# Patient Record
Sex: MALE | Race: WHITE | NOT HISPANIC OR LATINO | Employment: UNEMPLOYED | URBAN - METROPOLITAN AREA
[De-identification: names, ages, dates, MRNs, and addresses within clinical notes are randomized per-mention and may not be internally consistent; named-entity substitution may affect disease eponyms.]

---

## 2017-10-26 ENCOUNTER — HOSPITAL ENCOUNTER (EMERGENCY)
Facility: HOSPITAL | Age: 62
Discharge: HOME | End: 2017-10-26
Attending: EMERGENCY MEDICINE
Payer: COMMERCIAL

## 2017-10-26 VITALS
WEIGHT: 250 LBS | BODY MASS INDEX: 32.08 KG/M2 | HEART RATE: 87 BPM | OXYGEN SATURATION: 98 % | TEMPERATURE: 97 F | SYSTOLIC BLOOD PRESSURE: 157 MMHG | HEIGHT: 74 IN | DIASTOLIC BLOOD PRESSURE: 95 MMHG | RESPIRATION RATE: 16 BRPM

## 2017-10-26 DIAGNOSIS — M65.4 DE QUERVAIN'S TENOSYNOVITIS, RIGHT: Primary | ICD-10-CM

## 2017-10-26 PROCEDURE — 99283 EMERGENCY DEPT VISIT LOW MDM: CPT

## 2017-10-26 RX ORDER — NAPROXEN 500 MG/1
500 TABLET ORAL 2 TIMES DAILY WITH MEALS
Qty: 10 TABLET | Refills: 0 | Status: SHIPPED | OUTPATIENT
Start: 2017-10-26 | End: 2017-10-31

## 2017-10-26 NOTE — ED PROVIDER NOTES
"Encounter Date: 10/26/2017       History     Chief Complaint   Patient presents with    Hand Pain     no injury     Patient is a 61 year old male who presents with right thumb pain for about three days. He denied PMH. 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 He states the pain is isolated to the thumb and to the radial wrist. He states he saw online a test, which is the Finkelstein test, that reproduced his pain so he bought a thumb spica splint which he has been using. He states he took the splint off last night and felt like he extended his hand and is concerned he "broke my tendon". He denied decreased strength, erythema, warmth or specific injury. He denied numbness/tingling.       The history is provided by the patient.     Review of patient's allergies indicates:  No Known Allergies  No past medical history on file.  No past surgical history on file.  History reviewed. No pertinent family history.  Social History   Substance Use Topics    Smoking status: Not on file    Smokeless tobacco: Not on file    Alcohol use Not on file     Review of Systems   Constitutional: Negative for activity change, appetite change, chills and fever.   HENT: Negative for congestion, rhinorrhea and sore throat.    Respiratory: Negative for cough, chest tightness and shortness of breath.    Cardiovascular: Negative for chest pain.   Gastrointestinal: Negative for abdominal pain, diarrhea, nausea and vomiting.   Genitourinary: Negative for dysuria and frequency.   Musculoskeletal: Negative for back pain, neck pain and neck stiffness.        + right thumb pain   Skin: Negative for rash.   Neurological: Negative for dizziness, syncope, numbness and headaches.       Physical Exam     Initial Vitals [10/26/17 0908]   BP Pulse Resp Temp SpO2   (!) 157/95 87 16 97.4 °F (36.3 °C) 98 %      MAP       115.67         Physical Exam    Constitutional: " Vital signs are normal. He appears well-developed and well-nourished. He is cooperative.  Non-toxic appearance. He does not have a sickly appearance.   HENT:   Head: Normocephalic and atraumatic.   Right Ear: External ear normal.   Left Ear: External ear normal.   Nose: Nose normal.   Mouth/Throat: Oropharynx is clear and moist.   Eyes: Conjunctivae and lids are normal. Pupils are equal, round, and reactive to light.   Neck: Normal range of motion and full passive range of motion without pain. Neck supple.   Cardiovascular: Normal rate and regular rhythm.   Pulmonary/Chest: Breath sounds normal. No respiratory distress. He has no wheezes.   Abdominal: Soft. Normal appearance. There is no tenderness. There is no rigidity, no rebound and no guarding.   Musculoskeletal:        Right hand: He exhibits tenderness. He exhibits normal range of motion, no bony tenderness, no deformity and no swelling. Normal sensation noted. Normal strength noted.   Tenderness to the radial wrist and with movement of the thumb. He has a positive Finkelstein test. He has no swelling, erythema or warmth. He has full ROM. He has normal strength. Negative phalen's sign.    Neurological: He is alert and oriented to person, place, and time.   Skin: Skin is warm, dry and intact. No rash noted.         ED Course   Procedures  Labs Reviewed - No data to display          Medical Decision Making:   History:   Old Medical Records: I decided to obtain old medical records.       APC / Resident Notes:   This is an emergent evaluation of a 61-year-old male who presents with right hand pain.  He has tenderness over the radial aspect of the right wrist and thumb.  He has a positive Finkelstein test.  He has no swelling, erythema or warmth.  I doubt acute, infected tendosynovitis.  He denied injury.  My suspicion for fracture is low.  He has good strength.  No sign of tendon rupture.  He is neurovascularly intact.  He denied any bodily thumb spica splint  which I've instructed him to continue to wear.  Will add NSAIDs and close follow-up with orthopedics. Discussed results with patient. Return precautions given. Patient is to follow up with their primary care provider. Case was discussed with Dr. Mahmood who is in agreement with the plan of care. All questions answered.            Attending Attestation:     Physician Attestation Statement for NP/PA:   I discussed this assessment and plan of this patient with the NP/PA, but I did not personally examine the patient. The face to face encounter was performed by the NP/PA.                  ED Course      Clinical Impression:   The encounter diagnosis was De Quervain's tenosynovitis, right.                           LEANNE DumontC  10/26/17 6546       Gio Mahmood MD  10/27/17 5369

## 2017-10-26 NOTE — DISCHARGE INSTRUCTIONS
See handout for ways to help with the pain.  Take medication as prescribed.  See orthopedics if symptoms don't improve.  REST the hand.  For worsening symptoms, chest pain, shortness of breath, increased abdominal pain, high grade fever, stroke or stroke like symptoms, immediately go to the nearest Emergency Room or call 911 as soon as possible.

## 2024-06-13 DIAGNOSIS — R06.83 SNORING: ICD-10-CM

## 2024-06-13 DIAGNOSIS — G47.33 OBSTRUCTIVE SLEEP APNEA: Primary | ICD-10-CM

## 2024-06-13 DIAGNOSIS — R53.83 FATIGUE: ICD-10-CM

## 2024-06-13 DIAGNOSIS — G47.19 EXCESSIVE DAYTIME SLEEPINESS: ICD-10-CM

## 2024-06-24 ENCOUNTER — PROCEDURE VISIT (OUTPATIENT)
Dept: SLEEP MEDICINE | Facility: HOSPITAL | Age: 69
End: 2024-06-24
Attending: INTERNAL MEDICINE
Payer: MEDICARE

## 2024-06-24 DIAGNOSIS — R53.83 FATIGUE: ICD-10-CM

## 2024-06-24 DIAGNOSIS — G47.33 OBSTRUCTIVE SLEEP APNEA: ICD-10-CM

## 2024-06-24 DIAGNOSIS — G47.19 EXCESSIVE DAYTIME SLEEPINESS: ICD-10-CM

## 2024-06-24 DIAGNOSIS — R06.83 SNORING: ICD-10-CM

## 2024-06-24 PROCEDURE — 95806 SLEEP STUDY UNATT&RESP EFFT: CPT

## 2024-07-11 DIAGNOSIS — R06.83 SNORING: ICD-10-CM

## 2024-07-11 DIAGNOSIS — G47.19 EXCESSIVE DAYTIME SLEEPINESS: ICD-10-CM

## 2024-07-11 DIAGNOSIS — R53.83 FATIGUE: ICD-10-CM

## 2024-07-11 DIAGNOSIS — G47.33 OBSTRUCTIVE SLEEP APNEA: Primary | ICD-10-CM

## 2024-08-13 ENCOUNTER — PROCEDURE VISIT (OUTPATIENT)
Dept: SLEEP MEDICINE | Facility: HOSPITAL | Age: 69
End: 2024-08-13
Attending: INTERNAL MEDICINE
Payer: MEDICARE

## 2024-08-13 DIAGNOSIS — G47.19 EXCESSIVE DAYTIME SLEEPINESS: ICD-10-CM

## 2024-08-13 DIAGNOSIS — R06.83 SNORING: ICD-10-CM

## 2024-08-13 DIAGNOSIS — G47.33 OBSTRUCTIVE SLEEP APNEA: ICD-10-CM

## 2024-08-13 DIAGNOSIS — R53.83 FATIGUE: ICD-10-CM

## 2024-08-13 PROCEDURE — 95811 POLYSOM 6/>YRS CPAP 4/> PARM: CPT

## 2024-11-07 ENCOUNTER — TELEPHONE (OUTPATIENT)
Dept: PULMONOLOGY | Facility: CLINIC | Age: 69
End: 2024-11-07

## 2024-11-07 NOTE — TELEPHONE ENCOUNTER
----- Message from Meagan sent at 11/7/2024  1:28 PM CST -----  Contact: [phone]  Patient needs first available appointment due to sleep apnea. Pt stated that his previous provider is retiring and patient just started a sleep apnea machine and would need a follow up visit in Feb 2025. Please call patient at [phone]. Thanks KB

## 2025-02-06 ENCOUNTER — OFFICE VISIT (OUTPATIENT)
Dept: PULMONOLOGY | Facility: CLINIC | Age: 70
End: 2025-02-06
Payer: MEDICARE

## 2025-02-06 VITALS
BODY MASS INDEX: 36.4 KG/M2 | HEART RATE: 71 BPM | OXYGEN SATURATION: 98 % | DIASTOLIC BLOOD PRESSURE: 81 MMHG | HEIGHT: 74 IN | SYSTOLIC BLOOD PRESSURE: 138 MMHG | WEIGHT: 283.63 LBS

## 2025-02-06 DIAGNOSIS — Z77.090 PERSONAL HISTORY OF CONTACT WITH AND (SUSPECTED) EXPOSURE TO ASBESTOS: ICD-10-CM

## 2025-02-06 DIAGNOSIS — Z87.891 PERSONAL HISTORY OF NICOTINE DEPENDENCE: ICD-10-CM

## 2025-02-06 DIAGNOSIS — L40.0 PLAQUE PSORIASIS: ICD-10-CM

## 2025-02-06 DIAGNOSIS — Z87.09 HISTORY OF PNEUMOTHORAX: ICD-10-CM

## 2025-02-06 DIAGNOSIS — G47.33 OSA (OBSTRUCTIVE SLEEP APNEA): Primary | ICD-10-CM

## 2025-02-06 PROCEDURE — 3075F SYST BP GE 130 - 139MM HG: CPT | Mod: CPTII,S$GLB,, | Performed by: NURSE PRACTITIONER

## 2025-02-06 PROCEDURE — 3288F FALL RISK ASSESSMENT DOCD: CPT | Mod: CPTII,S$GLB,, | Performed by: NURSE PRACTITIONER

## 2025-02-06 PROCEDURE — 3008F BODY MASS INDEX DOCD: CPT | Mod: CPTII,S$GLB,, | Performed by: NURSE PRACTITIONER

## 2025-02-06 PROCEDURE — 99999 PR PBB SHADOW E&M-EST. PATIENT-LVL III: CPT | Mod: PBBFAC,,, | Performed by: NURSE PRACTITIONER

## 2025-02-06 PROCEDURE — 3079F DIAST BP 80-89 MM HG: CPT | Mod: CPTII,S$GLB,, | Performed by: NURSE PRACTITIONER

## 2025-02-06 PROCEDURE — 99214 OFFICE O/P EST MOD 30 MIN: CPT | Mod: S$GLB,,, | Performed by: NURSE PRACTITIONER

## 2025-02-06 PROCEDURE — 1101F PT FALLS ASSESS-DOCD LE1/YR: CPT | Mod: CPTII,S$GLB,, | Performed by: NURSE PRACTITIONER

## 2025-02-06 PROCEDURE — 1159F MED LIST DOCD IN RCRD: CPT | Mod: CPTII,S$GLB,, | Performed by: NURSE PRACTITIONER

## 2025-02-06 RX ORDER — CETIRIZINE HYDROCHLORIDE 10 MG/1
10 TABLET ORAL
COMMUNITY
Start: 2024-02-14 | End: 2025-02-13

## 2025-02-06 RX ORDER — AZELASTINE 1 MG/ML
2 SPRAY, METERED NASAL
COMMUNITY
Start: 2024-08-13 | End: 2025-08-13

## 2025-02-06 RX ORDER — ALBUTEROL SULFATE 90 UG/1
2 INHALANT RESPIRATORY (INHALATION) EVERY 6 HOURS PRN
COMMUNITY
Start: 2024-02-14 | End: 2025-02-13

## 2025-02-06 RX ORDER — DORZOLAMIDE HYDROCHLORIDE AND TIMOLOL MALEATE 20; 5 MG/ML; MG/ML
1 SOLUTION/ DROPS OPHTHALMIC 2 TIMES DAILY
COMMUNITY
Start: 2024-12-12

## 2025-02-06 RX ORDER — IPRATROPIUM BROMIDE 21 UG/1
2 SPRAY, METERED NASAL
COMMUNITY
Start: 2024-08-13 | End: 2025-08-13

## 2025-02-06 RX ORDER — PANTOPRAZOLE SODIUM 40 MG/1
40 TABLET, DELAYED RELEASE ORAL
COMMUNITY
Start: 2024-08-13 | End: 2025-08-13

## 2025-02-06 RX ORDER — SILDENAFIL 100 MG/1
100 TABLET, FILM COATED ORAL
COMMUNITY

## 2025-02-06 RX ORDER — MOMETASONE FUROATE MONOHYDRATE 50 UG/1
1 SPRAY, METERED NASAL 2 TIMES DAILY
COMMUNITY

## 2025-02-06 RX ORDER — LATANOPROST 50 UG/ML
1 SOLUTION/ DROPS OPHTHALMIC NIGHTLY
COMMUNITY
Start: 2025-01-13

## 2025-02-06 RX ORDER — MONTELUKAST SODIUM 10 MG/1
10 TABLET ORAL
COMMUNITY
Start: 2024-02-14 | End: 2025-02-13

## 2025-02-06 NOTE — PROGRESS NOTES
2/6/2025    Mejia Ruff  New Patient Consult    Chief Complaint   Patient presents with    Sleep Apnea       HPI:  02/06/2025: Hx: SHAI, Asbestosis Exposure, Pneumothorax  In office today alone.  Denies current use of inhaler therapy, supplemental oxygen. Denies personal history of cancer, PE, current anticoagulation use.  Former patient of Dr. Robbins for management of SHAI.   Patient underwent home sleep study in JUNE 2024 revealing RDI 17.4 - then underwent Titration study in AUG revealing the need for CPAP therapy. Using CPAP nightly with no great issue reported - states he does not notice great benefit with CPAP use - still suffering with insomnia - states he wakes up every two hours and eats throughout the night.   CPAP DME is [facility]. Using a full face mask currently.  Does endorse history of spontaneous pneumothorax x 2 approx 15 years ago - thinks may be secondary to horse back riding at that time - for the 2nd Pneumo underwent TALC placement.   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  States he developed cough that was severe in nature with productive mucus.  Has trialed several different inhalers with no reported benefit.  At that time was being followed by pulmonology out of [facility].  States cough and mucus has essentially resolved since onset.  Denies current use of inhaler therapy.  Around that time also underwent evaluation per allergist in which he was told there was an allergy to a specific grass.  Denies significant shortness of breath, wheezing, chest tightness, cough, mucus production.  Does endorse SOB with exertion such as walking outside 100 yards, working in the yard - typically improves with rest.         Social Hx: Lives with wife - no animals in the home. 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 [employer]. Positive Asbestosis exposure, Smoking Hx: Former smoker, quit 15 years ago -  started at 33 YO - typical use 1 ppd.   Family Hx: No Lung Cancer, Brother with COPD, No Asthma  Medical Hx: No previous pneumonia ; No previous shoulder/chest surgery        The Chief Complaint is: New to me      PFSH:  Past Medical History:   Diagnosis Date    Plaque psoriasis     Pneumothorax, unspecified          Past Surgical History:   Procedure Laterality Date    PLEURODESIS USING TALC       Social History     Tobacco Use    Smoking status: Former     Types: Cigarettes    Smokeless tobacco: Never     No family history on file.  Review of patient's allergies indicates:  No Known Allergies      I have reviewed past medical, family, and social history.     Performance Status:The patient's activity level is functions out of house.        Review of Systems   Constitutional:  Positive for fatigue. Negative for activity change, appetite change, chills, diaphoresis, fever and unexpected weight change.   HENT:  Negative for congestion, postnasal drip, rhinorrhea, sinus pressure, sinus pain, sore throat and trouble swallowing.    Eyes:  Negative for photophobia and visual disturbance.   Respiratory:  Negative for cough, choking, chest tightness, shortness of breath and wheezing.    Cardiovascular:  Negative for chest pain, palpitations and leg swelling.   Gastrointestinal:  Negative for abdominal distention, abdominal pain, blood in stool, constipation, diarrhea, nausea and vomiting.   Genitourinary:  Negative for difficulty urinating, dysuria, flank pain and hematuria.   Musculoskeletal:  Positive for neck pain. Negative for back pain, gait problem and joint swelling.   Skin:  Negative for rash and wound.   Allergic/Immunologic: Positive for environmental allergies and food allergies.        Lactose intolerance      Neurological:  Negative for dizziness, seizures, syncope, weakness, light-headedness, numbness and headaches.   Hematological:  Does not bruise/bleed easily.   Psychiatric/Behavioral:  Positive for sleep  "disturbance. Negative for confusion. The patient is not nervous/anxious.          Exam:Comprehensive exam done. /81 (BP Location: Right arm, Patient Position: Sitting)   Pulse 71   Ht 6' 2" (1.88 m)   Wt 128.7 kg (283 lb 10 oz)   SpO2 98% Comment: on room air at rest  BMI 36.41 kg/m²   Exam included Vitals as listed  Constitutional: He is oriented to person, place, and time. He appears well-developed. No distress.   Nose: Nose normal.   Mouth/Throat: Uvula is midline, oropharynx is clear and moist and mucous membranes are normal. No dental caries. No oropharyngeal exudate, posterior oropharyngeal edema, posterior oropharyngeal erythema or tonsillar abscesses.    Eyes: Pupils are equal, round, and reactive to light.   Neck: No JVD present. No thyromegaly present.   Cardiovascular: Normal rate, regular rhythm and normal heart sounds. Exam reveals no gallop and no friction rub.   No murmur heard.  Pulmonary/Chest: Effort normal and breath sounds normal. No accessory muscle usage or stridor. No apnea and no tachypnea. No respiratory distress, decreased breath sounds, wheezes, rhonchi, rales, or tenderness.   Abdominal: Soft. He exhibits no mass. There is no tenderness. No hepatosplenomegaly, hernias and normoactive bowel sounds  Musculoskeletal: Normal range of motion. exhibits no edema.   Neurological:  alert and oriented to person, place, and time. not disoriented.   Skin: Skin is warm and dry. Capillary refill takes less 2 sec. No cyanosis or erythema. No pallor. Nails show no clubbing.   Psychiatric: normal mood and affect. behavior is normal. Judgment and thought content normal.       Radiographs (ct chest and cxr) reviewed: was done by direct view   Patient imaging studies were reviewed and interpreted independently. My personal interpretation of most recent images include:    CT Abdomen Pelvis W Wo Contrast 7/15/2015 - RLL infiltrate vs atelectasis? Minimal pleural thickening RLL     Labs Patient's " labs were reviewed including CBC and CMP under Care Everywhere       PFT will be done and results to be reviewed  Pulmonary Functions Testing Results:        Plan:  Clinical impression is resonably certain and repeated evaluation prn +/- follow up will be needed as below.    Mejia was seen today for sleep apnea.    Diagnoses and all orders for this visit:    SHAI (obstructive sleep apnea)  -     CPAP/BIPAP SUPPLIES    History of pneumothorax  -     CT Chest Without Contrast; Future    Personal history of nicotine dependence    Personal history of contact with and (suspected) exposure to asbestos    Plaque psoriasis        Follow up in about 1 year (around 2/6/2026), or if symptoms worsen or fail to improve.    Discussed with patient above for education the following:      Patient Instructions   Waiting on your CPAP compliance report from [facility].    Continue CPAP nightly in the meantime.    I sent supply order to [facility] - this should be good for one year.    CT ABD from 2015 shows pleural thickening to right lower lobe - this is likely consistent with prior TALC powder placement for Pneumo however should be further investigated pre-cautiously.    CT chest without contrast ordered now.    Will request medical records from Dr. Maddy VILLAFUERTE in [place] and PFT from Dr. Robbins's office.    Continue current prescription medication regiment. Keep follow up appointment as scheduled. Please call the office if you have any questions or concerns.         Addend - CPAP compliance report reviewed from dates 11/09/2024 - 02/06/2025  Usage > = 4 hours 79%  APAP 7-20  AHI 2.0  95th percentile pressure 9.4

## 2025-02-06 NOTE — PATIENT INSTRUCTIONS
Waiting on your CPAP compliance report from [facility].    Continue CPAP nightly in the meantime.    I sent supply order to [facility] - this should be good for one year.    CT ABD from 2015 shows pleural thickening to right lower lobe - this is likely consistent with prior TALC powder placement for Pneumo however should be further investigated pre-cautiously.    CT chest without contrast ordered now.    Will request medical records from Dr. Maddy VILLAFUERTE in [place] and PFT from Dr. Robbins's office.    Continue current prescription medication regiment. Keep follow up appointment as scheduled. Please call the office if you have any questions or concerns.

## 2025-02-11 ENCOUNTER — TELEPHONE (OUTPATIENT)
Dept: PULMONOLOGY | Facility: CLINIC | Age: 70
End: 2025-02-11
Payer: MEDICARE

## 2025-02-11 NOTE — TELEPHONE ENCOUNTER
FAXED OVER MEDICAL RECORD RELEASE TO DR BHATIA'S OFFICE    FAX - [phone]  PHONE - [phone]

## 2025-04-14 ENCOUNTER — HOSPITAL ENCOUNTER (OUTPATIENT)
Dept: RADIOLOGY | Facility: HOSPITAL | Age: 70
Discharge: HOME | End: 2025-04-14
Attending: NURSE PRACTITIONER
Payer: MEDICARE

## 2025-04-14 DIAGNOSIS — Z87.09 HISTORY OF PNEUMOTHORAX: ICD-10-CM

## 2025-04-14 PROCEDURE — 71250 CT THORAX DX C-: CPT | Mod: TC

## 2025-04-21 ENCOUNTER — RESULTS FOLLOW-UP (OUTPATIENT)
Dept: PULMONOLOGY | Facility: CLINIC | Age: 70
End: 2025-04-21

## 2025-04-21 DIAGNOSIS — J18.9 PNEUMONIA OF LEFT LOWER LOBE DUE TO INFECTIOUS ORGANISM: Primary | ICD-10-CM

## 2025-04-21 RX ORDER — LEVOFLOXACIN 500 MG/1
500 TABLET, FILM COATED ORAL DAILY
Qty: 7 TABLET | Refills: 0 | Status: SHIPPED | OUTPATIENT
Start: 2025-04-21

## 2025-05-01 ENCOUNTER — TELEPHONE (OUTPATIENT)
Dept: PULMONOLOGY | Facility: CLINIC | Age: 70
End: 2025-05-01
Payer: MEDICARE

## 2025-05-01 NOTE — TELEPHONE ENCOUNTER
Pt stopped by clinic to  Sputum cups, and have XR scheduled. Appt scheduled 05/19/25 @ 0830, pt v/u.

## 2025-05-19 ENCOUNTER — HOSPITAL ENCOUNTER (OUTPATIENT)
Dept: RADIOLOGY | Facility: HOSPITAL | Age: 70
Discharge: HOME | End: 2025-05-19
Attending: NURSE PRACTITIONER
Payer: MEDICARE

## 2025-05-19 DIAGNOSIS — J18.9 PNEUMONIA OF LEFT LOWER LOBE DUE TO INFECTIOUS ORGANISM: ICD-10-CM

## 2025-05-19 PROCEDURE — 71046 X-RAY EXAM CHEST 2 VIEWS: CPT | Mod: 26,,, | Performed by: RADIOLOGY

## 2025-05-19 PROCEDURE — 71046 X-RAY EXAM CHEST 2 VIEWS: CPT | Mod: TC

## 2025-05-22 ENCOUNTER — RESULTS FOLLOW-UP (OUTPATIENT)
Dept: PULMONOLOGY | Facility: CLINIC | Age: 70
End: 2025-05-22

## 2025-05-26 DIAGNOSIS — J18.9 PNEUMONIA OF LEFT LOWER LOBE DUE TO INFECTIOUS ORGANISM: Primary | ICD-10-CM

## 2025-05-26 RX ORDER — LEVOFLOXACIN 500 MG/1
500 TABLET, FILM COATED ORAL DAILY
Qty: 14 TABLET | Refills: 0 | Status: SHIPPED | OUTPATIENT
Start: 2025-05-26 | End: 2025-06-09

## 2025-06-30 ENCOUNTER — LAB VISIT (OUTPATIENT)
Dept: LAB | Facility: HOSPITAL | Age: 70
End: 2025-06-30
Attending: NURSE PRACTITIONER
Payer: MEDICARE

## 2025-06-30 ENCOUNTER — TELEPHONE (OUTPATIENT)
Dept: PULMONOLOGY | Facility: CLINIC | Age: 70
End: 2025-06-30
Payer: MEDICARE

## 2025-06-30 DIAGNOSIS — J18.9 PNEUMONIA OF LEFT LOWER LOBE DUE TO INFECTIOUS ORGANISM: ICD-10-CM

## 2025-06-30 LAB
BACTERIA SPEC CULT: NORMAL
GRAM STAIN (RESPIRATORY) (SMH): NORMAL

## 2025-06-30 PROCEDURE — 87070 CULTURE OTHR SPECIMN AEROBIC: CPT

## 2025-06-30 NOTE — TELEPHONE ENCOUNTER
----- Message from Rita Carreon NP sent at 6/30/2025  2:11 PM CDT -----  Please let patient know a recollect is needed.  ----- Message -----  From: Lab, Background User  Sent: 6/30/2025   2:09 PM CDT  To: Rita Carreon NP

## 2025-07-07 ENCOUNTER — TELEPHONE (OUTPATIENT)
Dept: PULMONOLOGY | Facility: CLINIC | Age: 70
End: 2025-07-07
Payer: MEDICARE

## 2025-07-07 DIAGNOSIS — J18.9 PNEUMONIA OF LEFT LOWER LOBE DUE TO INFECTIOUS ORGANISM: Primary | ICD-10-CM

## 2025-07-07 NOTE — TELEPHONE ENCOUNTER
Spoke to patient.  Assisted him in scheduling his appts for his imaging orders.  Chest xray scheduled tomorrow.  He stated he's been coughing again, sometimes his sputum is clear and sometimes it is yellow.   I did ask him if he submitted sputum culture he said not as of yet.  I stressed for him to do that when he can.  Pt v/u.

## 2025-07-07 NOTE — TELEPHONE ENCOUNTER
Copied from CRM #[id number]. Topic: Appointments - Amb Referral  >> Jul 7, 2025  1:56 PM Lizette wrote:  Type:  Needs Medical Advice    Who Called: pt  Would the patient rather a call back or a response via [facility]? Call back   Best Call Back Number: [phone]   Additional Information: pt says doctor wanted a f/u chest xray, no orders in system and pt need to be scheduled

## 2025-07-08 ENCOUNTER — HOSPITAL ENCOUNTER (OUTPATIENT)
Dept: RADIOLOGY | Facility: HOSPITAL | Age: 70
Discharge: HOME | End: 2025-07-08
Attending: NURSE PRACTITIONER
Payer: MEDICARE

## 2025-07-08 DIAGNOSIS — J18.9 PNEUMONIA OF LEFT LOWER LOBE DUE TO INFECTIOUS ORGANISM: ICD-10-CM

## 2025-07-08 PROCEDURE — 71046 X-RAY EXAM CHEST 2 VIEWS: CPT | Mod: TC

## 2025-07-08 PROCEDURE — 71046 X-RAY EXAM CHEST 2 VIEWS: CPT | Mod: 26,,, | Performed by: RADIOLOGY

## 2025-07-16 ENCOUNTER — LAB VISIT (OUTPATIENT)
Dept: LAB | Facility: HOSPITAL | Age: 70
End: 2025-07-16
Attending: NURSE PRACTITIONER
Payer: MEDICARE

## 2025-07-16 DIAGNOSIS — J18.9 PNEUMONIA OF LEFT LOWER LOBE DUE TO INFECTIOUS ORGANISM: ICD-10-CM

## 2025-07-16 PROCEDURE — 87070 CULTURE OTHR SPECIMN AEROBIC: CPT

## 2025-07-18 LAB
BACTERIA SPEC CULT: NORMAL
GRAM STAIN (RESPIRATORY) (SMH): NORMAL

## 2025-07-23 ENCOUNTER — TELEPHONE (OUTPATIENT)
Dept: PULMONOLOGY | Facility: CLINIC | Age: 70
End: 2025-07-23
Payer: MEDICARE

## 2025-07-23 NOTE — TELEPHONE ENCOUNTER
Copied from CRM #[id number]. Topic: General Inquiry - Test Results  >> Jul 23, 2025  9:38 AM Med Assistant Emiliano wrote:  Type:  Test Results    Who Called: patient  Name of Test (Lab/Mammo/Etc): spudem  Ordering Provider: Rita Carreon  Where the test was performed: [facility]  Would the patient rather a call back or a response via [facility]? call  Best Call Back [facility]  [phone]  Additional Information:  Please call patient to advise.  Thanks!

## 2025-07-23 NOTE — TELEPHONE ENCOUNTER
Pt was informed of sputum and cxr results  pt stated he still has the cough off/on but his concern is the throat irritation and wanted to make sure that the tests were negative  pt thinks the irritation is due to him sleeping with his mouth open because he has been tested for allergies  also confirmed date/time of CT  pt v/u

## 2025-07-30 ENCOUNTER — TELEPHONE (OUTPATIENT)
Dept: PULMONOLOGY | Facility: CLINIC | Age: 70
End: 2025-07-30
Payer: MEDICARE

## 2025-07-30 NOTE — TELEPHONE ENCOUNTER
Copied from CRM #[id number]. Topic: Appointments - Appointment Scheduling  >> Jul 30, 2025 12:46 PM Deven wrote:  .Type:  Sooner Apoointment Request    Caller is requesting a sooner appointment.  Caller declined first available appointment listed below.  Caller will not accept being placed on the waitlist and is requesting a message be sent to doctor.  Name of Caller:pt  When is the first available appointment?sooner than tommy appt  Symptoms:f/u  Would the patient rather a call back or a response via [facility]? Call back  Best Call Back Number:[phone]   Additional Information: pt st can some ne give him a call with a sooner avail appt. St he received a text with a sooner avail appt & when he accepted in the 3 mins the appt was unavail. It was an appt for tomorrow. Pt st he wanted to inform office he was trying to accept appt. Please call to discuss.

## 2025-07-31 ENCOUNTER — OFFICE VISIT (OUTPATIENT)
Dept: PULMONOLOGY | Facility: CLINIC | Age: 70
End: 2025-07-31
Payer: MEDICARE

## 2025-07-31 VITALS
HEIGHT: 74 IN | SYSTOLIC BLOOD PRESSURE: 129 MMHG | OXYGEN SATURATION: 96 % | BODY MASS INDEX: 36.36 KG/M2 | DIASTOLIC BLOOD PRESSURE: 74 MMHG | WEIGHT: 283.31 LBS | HEART RATE: 71 BPM

## 2025-07-31 DIAGNOSIS — G47.33 OSA (OBSTRUCTIVE SLEEP APNEA): Primary | ICD-10-CM

## 2025-07-31 DIAGNOSIS — Z13.6 ENCOUNTER FOR SCREENING FOR CARDIOVASCULAR DISORDERS: ICD-10-CM

## 2025-07-31 DIAGNOSIS — Z87.09 HISTORY OF PNEUMOTHORAX: ICD-10-CM

## 2025-07-31 DIAGNOSIS — J18.9 PNEUMONIA OF LEFT LOWER LOBE DUE TO INFECTIOUS ORGANISM: ICD-10-CM

## 2025-07-31 DIAGNOSIS — L40.0 PLAQUE PSORIASIS: ICD-10-CM

## 2025-07-31 DIAGNOSIS — Z87.891 PERSONAL HISTORY OF NICOTINE DEPENDENCE: ICD-10-CM

## 2025-07-31 PROCEDURE — 99999 PR PBB SHADOW E&M-EST. PATIENT-LVL III: CPT | Mod: PBBFAC,,, | Performed by: NURSE PRACTITIONER

## 2025-07-31 NOTE — PROGRESS NOTES
7/31/2025    Mejia Verary  In office visit     Chief Complaint   Patient presents with    Cough       HPI:  07/31/2025: Hx: SHAI, Asbestosis Exposure, Pneumothorax  After last appointment with me underwent CT Chest concerning for LLL pneumonia - was treated at that time by  with Rx for Augmentin in which he completed however was still experiencing cough, mucous production - I then called in [facility] in which he reports completion and great improvement of symptoms. Has since undergone repeat CXR with no acute findings.   Has since returned CPAP machine back to [employer] [facility] - states he was not sleeping better with CPAP use, was getting less sleep with CPAP use. Has since been prescribed melatonin per his PCP in which that is helping him sleep better.  Did develop coughing fit around July 4 that lasted approx 7-10 days - states he has since started using Nasal lavage nightly which seems to help. Thinks he may be allergic to the Fixadent.           02/06/2025: Hx: SHAI, Asbestosis Exposure, Pneumothorax  In office today alone.  Denies current use of inhaler therapy, supplemental oxygen. Denies personal history of cancer, PE, current anticoagulation use.  Former patient of Dr. Robbins for management of SHAI.   Patient underwent home sleep study in JUNE 2024 revealing RDI 17.4 - then underwent Titration study in AUG revealing the need for CPAP therapy. Using CPAP nightly with no great issue reported - states he does not notice great benefit with CPAP use - still suffering with insomnia - states he wakes up every two hours and eats throughout the night.   CPAP DME is [facility]. Using a full face mask currently.  Does endorse history of spontaneous pneumothorax x 2 approx 15 years ago - thinks may be secondary to horse back riding at that time - for the 2nd Pneumo underwent TALC placement.   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  States he  developed cough that was severe in nature with productive mucus.  Has trialed several different inhalers with no reported benefit.  At that time was being followed by pulmonology out of [facility].  States cough and mucus has essentially resolved since onset.  Denies current use of inhaler therapy.  Around that time also underwent evaluation per allergist in which he was told there was an allergy to a specific grass.  Denies significant shortness of breath, wheezing, chest tightness, cough, mucus production.  Does endorse SOB with exertion such as walking outside 100 yards, working in the yard - typically improves with rest.   Patient Instructions   Waiting on your CPAP compliance report from [employer].  Continue CPAP nightly in the meantime.  I sent supply order to [employer] - this should be good for one year.  CT ABD from 2015 shows pleural thickening to right lower lobe - this is likely consistent with prior TALC powder placement for Pneumo however should be further investigated pre-cautiously.  CT chest without contrast ordered now.  Will request medical records from Dr. Maddy VILLAFUERTE in [place] and PFT from Dr. Robbins's office.  Continue current prescription medication regiment. Keep follow up appointment as scheduled. Please call the office if you have any questions or concerns.   Addend - CPAP compliance report reviewed from dates 11/09/2024 - 02/06/2025  Usage > = 4 hours 79%  APAP 7-20  AHI 2.0  95th percentile pressure 9.4            Social Hx: Lives with wife - no animals in the home. 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 [employer]. Positive Asbestosis exposure, Smoking Hx: Former smoker, quit 15 years ago - started at 33 YO - typical use 1 ppd.   Family Hx: No Lung Cancer, Brother with COPD, No Asthma  Medical Hx: No previous pneumonia ; No previous shoulder/chest surgery        The Chief Complaint varies with instability at times.       PFSH:  Past Medical History:   Diagnosis Date  "   Plaque psoriasis     Pneumothorax, unspecified          Past Surgical History:   Procedure Laterality Date    PLEURODESIS USING TALC       Social History     Tobacco Use    Smoking status: Former     Types: Cigarettes    Smokeless tobacco: Never     No family history on file.  Review of patient's allergies indicates:  No Known Allergies      I have reviewed past medical, family, and social history.     Performance Status:The patient's activity level is functions out of house.      Review of Systems:  a review of eleven systems covering constitutional, Eye, HEENT, Psych, Respiratory, Cardiac, GI, , Musculoskeletal, Endocrine, Dermatologic was negative except for pertinent findings as listed ABOVE and below: pertinent positive as above, rest is good       Exam:Comprehensive exam done. /74 (BP Location: Right arm, Patient Position: Sitting)   Pulse 71   Ht 6' 2" (1.88 m)   Wt 128.5 kg (283 lb 4.7 oz)   SpO2 96% Comment: on room air at rest  BMI 36.37 kg/m²   Exam included Vitals as listed  Constitutional: He is oriented to person, place, and time. He appears well-developed. No distress.   Nose: Nose normal.   Mouth/Throat: Uvula is midline, oropharynx is clear and moist and mucous membranes are normal. No dental caries. No oropharyngeal exudate, posterior oropharyngeal edema, posterior oropharyngeal erythema or tonsillar abscesses.    Eyes: Pupils are equal, round, and reactive to light.   Neck: No JVD present. No thyromegaly present.   Cardiovascular: Normal rate, regular rhythm and normal heart sounds. Exam reveals no gallop and no friction rub.   No murmur heard.  Pulmonary/Chest: Effort normal and breath sounds normal. No accessory muscle usage or stridor. No apnea and no tachypnea. No respiratory distress, decreased breath sounds, wheezes, rhonchi, rales, or tenderness.   Abdominal: Soft. He exhibits no mass. There is no tenderness. No hepatosplenomegaly, hernias and normoactive bowel " sounds  Musculoskeletal: Normal range of motion. exhibits no edema.   Neurological:  alert and oriented to person, place, and time. not disoriented.   Skin: Skin is warm and dry. Capillary refill takes less 2 sec. No cyanosis or erythema. No pallor. Nails show no clubbing.   Psychiatric: normal mood and affect. behavior is normal. Judgment and thought content normal.       Radiographs (ct chest and cxr) reviewed: was done by direct view   Patient imaging studies were reviewed and interpreted independently. My personal interpretation of most recent images include:  CT Chest Without Contrast 4/14/2025 RLL findings are chronic from known right lung injury in the past. LLL findings concerned for infection/inflammation  CT Abdomen Pelvis W Wo Contrast 7/15/2015 - RLL infiltrate vs atelectasis? Minimal pleural thickening RLL         Labs Patient's labs were reviewed including CBC and CMP under Care Everywhere       PFT will be done and results to be reviewed  Pulmonary Functions Testing Results:        Plan:  Clinical impression is resonably certain and repeated evaluation prn +/- follow up will be needed as below.    Mejia was seen today for cough.    Diagnoses and all orders for this visit:    SHAI (obstructive sleep apnea)    History of pneumothorax    Personal history of nicotine dependence    Plaque psoriasis    Pneumonia of left lower lobe due to infectious organism  -     Cancel: X-Ray Chest PA And Lateral; Future          Follow up in about 1 year (around 7/31/2026), or if symptoms worsen or fail to improve.    Discussed with patient above for education the following:      Patient Instructions   Overall, you still have sleep apnea based on prior sleep study however you decline CPAP therapy at this time.    If you ever change your mind, please let me know.    Can repeat CT CHEST in three months to ensure full clearing of prior noted pneumonia.    Have not received PFT from prior Pulmonologist office. Can consider  repeat if you develop respiratory symptoms.    Can also consider ENT referral if you are interested.    Continue current prescription medication regiment. Keep follow up appointment as scheduled. Please call the office if you have any questions or concerns.

## 2025-07-31 NOTE — PATIENT INSTRUCTIONS
Overall, you still have sleep apnea based on prior sleep study however you decline CPAP therapy at this time.    If you ever change your mind, please let me know.    Can repeat CT CHEST in three months to ensure full clearing of prior noted pneumonia.    Have not received PFT from prior Pulmonologist office. Can consider repeat if you develop respiratory symptoms.    Can also consider ENT referral if you are interested.    Continue current prescription medication regiment. Keep follow up appointment as scheduled. Please call the office if you have any questions or concerns.